# Patient Record
Sex: FEMALE | Race: WHITE | NOT HISPANIC OR LATINO | Employment: PART TIME | URBAN - METROPOLITAN AREA
[De-identification: names, ages, dates, MRNs, and addresses within clinical notes are randomized per-mention and may not be internally consistent; named-entity substitution may affect disease eponyms.]

---

## 2022-08-09 ENCOUNTER — HOSPITAL ENCOUNTER (EMERGENCY)
Facility: CLINIC | Age: 37
Discharge: HOME | End: 2022-08-10
Attending: EMERGENCY MEDICINE | Admitting: EMERGENCY MEDICINE
Payer: MEDICAID

## 2022-08-09 VITALS
BODY MASS INDEX: 22.73 KG/M2 | SYSTOLIC BLOOD PRESSURE: 122 MMHG | DIASTOLIC BLOOD PRESSURE: 80 MMHG | HEART RATE: 71 BPM | OXYGEN SATURATION: 100 % | RESPIRATION RATE: 22 BRPM | HEIGHT: 68 IN | TEMPERATURE: 97.2 F | WEIGHT: 150 LBS

## 2022-08-09 DIAGNOSIS — J04.0 ACUTE VIRAL LARYNGITIS: ICD-10-CM

## 2022-08-09 LAB — DEPRECATED S PYO AG THROAT QL EIA: NEGATIVE

## 2022-08-09 PROCEDURE — 250N000011 HC RX IP 250 OP 636: Performed by: EMERGENCY MEDICINE

## 2022-08-09 PROCEDURE — 99283 EMERGENCY DEPT VISIT LOW MDM: CPT

## 2022-08-09 PROCEDURE — 250N000009 HC RX 250: Performed by: EMERGENCY MEDICINE

## 2022-08-09 PROCEDURE — 87651 STREP A DNA AMP PROBE: CPT | Performed by: EMERGENCY MEDICINE

## 2022-08-09 RX ADMIN — ORAL VEHICLES - SUSP 10 MG: SUSPENSION at 23:59

## 2022-08-09 ASSESSMENT — ENCOUNTER SYMPTOMS
TROUBLE SWALLOWING: 1
COUGH: 1
VOICE CHANGE: 1
SORE THROAT: 1

## 2022-08-09 ASSESSMENT — ACTIVITIES OF DAILY LIVING (ADL): ADLS_ACUITY_SCORE: 33

## 2022-08-09 NOTE — LETTER
August 9, 2022      To Whom It May Concern:      Neisha Lucio was seen in our Emergency Department today, 08/09/22.  I expect her condition to improve over the next 2-3 days.  She may return to work/school when improved.    Sincerely,        Maurisio Lindo MD

## 2022-08-10 LAB — GROUP A STREP BY PCR: NOT DETECTED

## 2022-08-10 NOTE — ED PROVIDER NOTES
"  History   Chief Complaint:  Pharyngitis       HPI   Neisha Lucio is a 37 year old female with no significant PMH who presents with pharyngitis. The patient reports onset of a sore throat yesterday. She states that it worsened today and it hurts to swallow and she feels as if she is not getting as much air down. She also notes a productive cough with greenish brown mucous. She has been taking motrin and Tylenol. Her last dose of Tylenol was at 1800. Her voice is hoarse. She states that her son is currently sick at home with congestion and an ear infection. 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 She denies any fever. No facial or neck swelling. She denies any other symptoms. She reports she recently recovered from a mild COVID infection last month.     Review of Systems   HENT: Positive for sore throat, trouble swallowing and voice change.    Respiratory: Positive for cough (productive).    All other systems reviewed and are negative.      Allergies:  The patient has no known allergies.     Medications:  Zoloft     Past Medical History:     Anxiety     Past Surgical History:       Appendectomy     Social History:  The patient presents to the ED alone  Living Situation: she lives with her children    Physical Exam     Patient Vitals for the past 24 hrs:   BP Temp Temp src Pulse Resp SpO2 Height Weight   22 2159 122/80 97.2  F (36.2  C) Temporal 71 22 100 % 1.727 m (5' 8\") 68 kg (150 lb)       Physical Exam  General: Well appearing, nontoxic. Resting comfortably  Head:  Scalp, face, and head appear normal  Eyes:  Pupils equal, round, and reactive to light    Conjunctivae noninjected and sclera white  ENT:    The nose is normal    Ears/pinnae are normal. TMs and auditory canals normal bilaterally.    MMM. Posterior pharynx mildly erythematous without swelling, exudates. Minimal swelling of the uvula. No mucosal lesions, tongue or lip swelling. No tongue elevation. Uvula  " "without deviation. Voice normal. No drooling or trismus. No peritonsillar fullness.   Neck:  Normal range of motion. Soft tissues of the neck are normal. Mild shotty cervical lymphadenopathy. No stridor. Voice is hoarse.   MSK:  Normal tone.   CV:  RRR, no M/R/G  Resp:  CTAB, no increased WOB  Skin:  No rash or lesions noted.  Neuro:  Speech is normal and fluent    Moves all extremities spontaneously  Psych: Awake, Alert. Normal affect      Appropriate interactions           Emergency Department Course   Laboratory:  Labs Ordered and Resulted from Time of ED Arrival to Time of ED Departure   STREPTOCOCCUS A RAPID SCREEN W REFELX TO PCR - Normal       Result Value    Group A Strep antigen Negative     GROUP A STREPTOCOCCUS PCR THROAT SWAB        Emergency Department Course:  Reviewed:  I reviewed nursing notes, vitals, past medical history and Care Everywhere    Assessments/consults:  ED Course as of 08/09/22 2318   Tue Aug 09, 2022   2303 I evaluated the patient, obtained history and explained results and plan at this time.       Interventions:  Decadron, 10 mg, PO    Disposition:  The patient was discharged to home.     Impression & Plan   Medical Decision Making:  Neisha Lucio is a 37 year old female he had mild COVID-19 infection last month who presents with 2 days of sore throat and hoarse voice.  On my evaluation she is well-appearing, hemodynamically stable and afebrile.  She has no fevers at home.  Rapid strep test is negative.  PCR confirmatory test is pending.  Her son is sick with URI symptoms and a \"ear infection\".  She has no evidence of otitis media or externa herself.  Findings are consistent with viral upper respiratory infection with pharyngitis and laryngitis.  For symptom relief she was treated with a single dose of Decadron.  No indication for antibiotics at this time.  I recommended supportive care with over-the-counter sore throat medications, Tylenol and ibuprofen.  I stressed the " importance of good oral fluid intake.  No evidence of peritonsillar abscess, Dede's angina, retropharyngeal abscess or other deep space infection of the skin or neck.  Findings and plan of care discussed with the patient.  She is agreeable with the plan of care.  Close return precautions were provided and she was discharged in stable condition.    Diagnosis:    ICD-10-CM    1. Acute viral laryngitis  J04.0        Discharge Medications:  New Prescriptions    No medications on file       Scribe Disclosure:  I, Son Arrington, am serving as a scribe at 10:54 PM on 8/9/2022 to document services personally performed by Maurisio Lindo MD based on my observations and the provider's statements to me.            Maurisio Lindo MD  08/10/22 [id number]

## 2023-02-25 ENCOUNTER — HOSPITAL ENCOUNTER (EMERGENCY)
Facility: CLINIC | Age: 38
Discharge: HOME | End: 2023-02-26
Attending: EMERGENCY MEDICINE | Admitting: EMERGENCY MEDICINE
Payer: COMMERCIAL

## 2023-02-25 VITALS
HEIGHT: 68 IN | TEMPERATURE: 98.3 F | RESPIRATION RATE: 18 BRPM | HEART RATE: 62 BPM | DIASTOLIC BLOOD PRESSURE: 74 MMHG | OXYGEN SATURATION: 100 % | BODY MASS INDEX: 23.49 KG/M2 | WEIGHT: 155 LBS | SYSTOLIC BLOOD PRESSURE: 142 MMHG

## 2023-02-25 DIAGNOSIS — J02.9 SORE THROAT: ICD-10-CM

## 2023-02-25 LAB
DEPRECATED S PYO AG THROAT QL EIA: NEGATIVE
FLUAV RNA SPEC QL NAA+PROBE: NEGATIVE
FLUBV RNA RESP QL NAA+PROBE: NEGATIVE
GROUP A STREP BY PCR: NOT DETECTED
RSV RNA SPEC NAA+PROBE: NEGATIVE
SARS-COV-2 RNA RESP QL NAA+PROBE: NEGATIVE

## 2023-02-25 PROCEDURE — 87637 SARSCOV2&INF A&B&RSV AMP PRB: CPT | Performed by: EMERGENCY MEDICINE

## 2023-02-25 PROCEDURE — C9803 HOPD COVID-19 SPEC COLLECT: HCPCS

## 2023-02-25 PROCEDURE — 87651 STREP A DNA AMP PROBE: CPT | Performed by: EMERGENCY MEDICINE

## 2023-02-25 PROCEDURE — 99283 EMERGENCY DEPT VISIT LOW MDM: CPT | Mod: CS

## 2023-02-26 PROCEDURE — 250N000009 HC RX 250: Performed by: EMERGENCY MEDICINE

## 2023-02-26 PROCEDURE — 250N000012 HC RX MED GY IP 250 OP 636 PS 637: Performed by: EMERGENCY MEDICINE

## 2023-02-26 RX ADMIN — ORAL VEHICLES - SUSP 10 MG: SUSPENSION at 00:22

## 2023-02-26 ASSESSMENT — ENCOUNTER SYMPTOMS
NAUSEA: 0
FEVER: 0
VOMITING: 0
SORE THROAT: 1
FACIAL SWELLING: 0
TROUBLE SWALLOWING: 1

## 2023-02-26 NOTE — ED TRIAGE NOTES
Pt reports sore throat started this AM and now feels like her throat is closing. Able to manage secretions. Feels similar to when she was here in August.

## 2023-02-26 NOTE — ED PROVIDER NOTES
"  History     Chief Complaint:  Pharyngitis       HPI   Neisha Lucio is a 37 year old female who presents with throat pain and tightness beginning today. Patient states that she developed a sore throat this morning and then noticed worsening swelling and tightness in her throat throughout the day. She notes that it is currently painful to talk and swallow, though she is still able to swallow. She took Motrin around  tonight and gargled with salt water with no relief of symptoms. Denies fevers, nausea, and vomiting. No lip or tongue swelling. Patient says her current symptoms are similar to the symptoms she was seen for in August of last year which required steroids for swelling, though the onset of today's symptoms were much more sudden. She adds that she is currently breastfeeding. Neisha denies any personal history of oral abscess and does not have any chronic medical problems.     Independent Historian:   None - Patient Only      ROS:  Review of Systems   Constitutional: Negative for fever.   HENT: Positive for sore throat and trouble swallowing. Negative for facial swelling.    Gastrointestinal: Negative for nausea and vomiting.     10 point review of systems performed and is negative except as above and in HPI.    Allergies:  No Known Allergies     Medications:    Zoloft    Past Medical History:    Anemia   Anxiety   Hematometra    Past Surgical History:    Appendectomy   section    D & C    Social History:  Arrives alone via private vehicle.    Physical Exam     Patient Vitals for the past 24 hrs:   BP Temp Temp src Pulse Resp SpO2 Height Weight   23 2129 (!) 142/74 98.3  F (36.8  C) Oral 62 18 100 % 1.727 m (5' 8\") 70.3 kg (155 lb)        Physical Exam  General: Alert, No distress. Nontoxic appearance  Head: No signs of trauma.   Mouth/Throat: Oropharynx moist. Slight tonsillar hypertrophy. No exudate. No erythema.   Eyes: Conjunctivae are normal. Pupils are equal..   Neck: Normal " range of motion.    CV: Appears well perfused.  Resp:No respiratory distress. No stridor.   MSK: Normal range of motion. No obvious deformity.   Neuro: The patient is alert and interactive. HAT[place]. Speech normal. GCS 15  Skin: No lesion or sign of trauma noted.   Psych: normal mood and affect. behavior is normal.     Emergency Department Course     Laboratory:  Labs Ordered and Resulted from Time of ED Arrival to Time of ED Departure   INFLUENZA A/B & SARS-COV2 PCR MULTIPLEX - Normal       Result Value    Influenza A PCR Negative      Influenza B PCR Negative      RSV PCR Negative      SARS CoV2 PCR Negative     STREPTOCOCCUS A RAPID SCREEN W REFELX TO PCR - Normal    Group A Strep antigen Negative     GROUP A STREPTOCOCCUS PCR THROAT SWAB - Normal    Group A strep by PCR Not Detected          Emergency Department Course & Assessments:       Interventions:  Medications   dexamethasone (DECADRON) alcohol-free oral solution 10 mg (10 mg Oral $Given 2/26/23 0022)      Assessments:  2340 I obtained history and examined the patient as noted above. I also explained findings at this time.  0024 Rechecked patient and confirmed that steroids are safe with breastfeeding.     Disposition:  The patient was discharged to home.     Impression & Plan      Medical Decision Making:  Neisha Lucio is a 37 year old female who presents for evaluation of a sore throat and clinical evidence of pharyngitis.  The rapid strep test is negative, and formal culture has been set up in the lab. There is no clinical evidence of peritonsillar abscess, retropharyngeal abscess, Lemierre's Syndrome, epiglottis, or Gomez's angina. The etiology is most likely viral.   I have recommended treatment with analgesics, and we will await formal culture results.  If the culture is positive, an ED physician will call the patient to initiate anti-microbial therapy. Return if increasing pain, change in voice, neck pain, vomiting, fever, or shortness of  breath. Follow-up with primary physician if not improving in 3-5 days. Given well appearance, I would not test further for other etiologies of serious bacterial infections. Plan discussed with patient. All questions answered.     Diagnosis:    ICD-10-CM    1. Sore throat  J02.9            Scribe Disclosure:  DAYANA, Lydia Cobb, am serving as a scribe at 12:14 AM on 2/26/2023 to document services personally performed by Ying Taylor MD based on my observations and the provider's statements to me.     2/25/2023   Ying Taylor M Debroux, Karah M, MD  02/26/23 0559

## 2024-01-19 ENCOUNTER — NURSE TRIAGE (OUTPATIENT)
Dept: NURSING | Facility: CLINIC | Age: 39
End: 2024-01-19
Payer: COMMERCIAL

## 2024-01-19 ENCOUNTER — HOSPITAL ENCOUNTER (EMERGENCY)
Facility: CLINIC | Age: 39
Discharge: HOME | End: 2024-01-19
Attending: PHYSICIAN ASSISTANT | Admitting: PHYSICIAN ASSISTANT
Payer: COMMERCIAL

## 2024-01-19 ENCOUNTER — APPOINTMENT (OUTPATIENT)
Dept: CT IMAGING | Facility: CLINIC | Age: 39
End: 2024-01-19
Attending: PHYSICIAN ASSISTANT
Payer: COMMERCIAL

## 2024-01-19 VITALS
DIASTOLIC BLOOD PRESSURE: 74 MMHG | TEMPERATURE: 97.8 F | OXYGEN SATURATION: 99 % | HEART RATE: 68 BPM | RESPIRATION RATE: 16 BRPM | WEIGHT: 160 LBS | HEIGHT: 68 IN | SYSTOLIC BLOOD PRESSURE: 134 MMHG | BODY MASS INDEX: 24.25 KG/M2

## 2024-01-19 DIAGNOSIS — R10.31 RLQ ABDOMINAL PAIN: ICD-10-CM

## 2024-01-19 LAB
ALBUMIN SERPL BCG-MCNC: 4.8 G/DL (ref 3.5–5.2)
ALBUMIN UR-MCNC: NEGATIVE MG/DL
ALP SERPL-CCNC: 66 U/L (ref 40–150)
ALT SERPL W P-5'-P-CCNC: NORMAL U/L
AMORPH CRY #/AREA URNS HPF: ABNORMAL /HPF
ANION GAP SERPL CALCULATED.3IONS-SCNC: 10 MMOL/L (ref 7–15)
APPEARANCE UR: CLEAR
AST SERPL W P-5'-P-CCNC: NORMAL U/L
BASOPHILS # BLD AUTO: 0.1 10E3/UL (ref 0–0.2)
BASOPHILS NFR BLD AUTO: 1 %
BILIRUB SERPL-MCNC: 0.3 MG/DL
BILIRUB UR QL STRIP: NEGATIVE
BUN SERPL-MCNC: 15.7 MG/DL (ref 6–20)
CALCIUM SERPL-MCNC: 9.4 MG/DL (ref 8.6–10)
CHLORIDE SERPL-SCNC: 101 MMOL/L (ref 98–107)
COLOR UR AUTO: ABNORMAL
CREAT SERPL-MCNC: 0.54 MG/DL (ref 0.51–0.95)
DEPRECATED HCO3 PLAS-SCNC: 26 MMOL/L (ref 22–29)
EGFRCR SERPLBLD CKD-EPI 2021: >90 ML/MIN/1.73M2
EOSINOPHIL # BLD AUTO: 0.1 10E3/UL (ref 0–0.7)
EOSINOPHIL NFR BLD AUTO: 2 %
ERYTHROCYTE [DISTWIDTH] IN BLOOD BY AUTOMATED COUNT: 12.4 % (ref 10–15)
GLUCOSE SERPL-MCNC: 94 MG/DL (ref 70–99)
GLUCOSE UR STRIP-MCNC: NEGATIVE MG/DL
HCG UR QL: NEGATIVE
HCT VFR BLD AUTO: 38.3 % (ref 35–47)
HGB BLD-MCNC: 13 G/DL (ref 11.7–15.7)
HGB UR QL STRIP: NEGATIVE
HOLD SPECIMEN: NORMAL
IMM GRANULOCYTES # BLD: 0 10E3/UL
IMM GRANULOCYTES NFR BLD: 0 %
KETONES UR STRIP-MCNC: NEGATIVE MG/DL
LEUKOCYTE ESTERASE UR QL STRIP: NEGATIVE
LYMPHOCYTES # BLD AUTO: 1.4 10E3/UL (ref 0.8–5.3)
LYMPHOCYTES NFR BLD AUTO: 24 %
MCH RBC QN AUTO: 31.6 PG (ref 26.5–33)
MCHC RBC AUTO-ENTMCNC: 33.9 G/DL (ref 31.5–36.5)
MCV RBC AUTO: 93 FL (ref 78–100)
MONOCYTES # BLD AUTO: 0.3 10E3/UL (ref 0–1.3)
MONOCYTES NFR BLD AUTO: 5 %
MUCOUS THREADS #/AREA URNS LPF: PRESENT /LPF
NEUTROPHILS # BLD AUTO: 4.2 10E3/UL (ref 1.6–8.3)
NEUTROPHILS NFR BLD AUTO: 68 %
NITRATE UR QL: NEGATIVE
NRBC # BLD AUTO: 0 10E3/UL
NRBC BLD AUTO-RTO: 0 /100
PH UR STRIP: 7 [PH] (ref 5–7)
PLATELET # BLD AUTO: 231 10E3/UL (ref 150–450)
POTASSIUM SERPL-SCNC: 4.8 MMOL/L (ref 3.4–5.3)
PROT SERPL-MCNC: 8 G/DL (ref 6.4–8.3)
RBC # BLD AUTO: 4.12 10E6/UL (ref 3.8–5.2)
RBC URINE: <1 /HPF
SODIUM SERPL-SCNC: 137 MMOL/L (ref 135–145)
SP GR UR STRIP: 1.01 (ref 1–1.03)
SQUAMOUS EPITHELIAL: <1 /HPF
UROBILINOGEN UR STRIP-MCNC: NORMAL MG/DL
WBC # BLD AUTO: 6.1 10E3/UL (ref 4–11)
WBC URINE: 0 /HPF

## 2024-01-19 PROCEDURE — 85025 COMPLETE CBC W/AUTO DIFF WBC: CPT | Performed by: PHYSICIAN ASSISTANT

## 2024-01-19 PROCEDURE — 258N000003 HC RX IP 258 OP 636: Performed by: PHYSICIAN ASSISTANT

## 2024-01-19 PROCEDURE — 250N000011 HC RX IP 250 OP 636: Performed by: PHYSICIAN ASSISTANT

## 2024-01-19 PROCEDURE — 81025 URINE PREGNANCY TEST: CPT | Performed by: PHYSICIAN ASSISTANT

## 2024-01-19 PROCEDURE — 99285 EMERGENCY DEPT VISIT HI MDM: CPT | Mod: 25

## 2024-01-19 PROCEDURE — 96374 THER/PROPH/DIAG INJ IV PUSH: CPT | Mod: 59

## 2024-01-19 PROCEDURE — 250N000009 HC RX 250: Performed by: PHYSICIAN ASSISTANT

## 2024-01-19 PROCEDURE — 81001 URINALYSIS AUTO W/SCOPE: CPT | Performed by: PHYSICIAN ASSISTANT

## 2024-01-19 PROCEDURE — 82247 BILIRUBIN TOTAL: CPT | Performed by: PHYSICIAN ASSISTANT

## 2024-01-19 PROCEDURE — 96361 HYDRATE IV INFUSION ADD-ON: CPT

## 2024-01-19 PROCEDURE — 74177 CT ABD & PELVIS W/CONTRAST: CPT

## 2024-01-19 PROCEDURE — 36415 COLL VENOUS BLD VENIPUNCTURE: CPT | Performed by: PHYSICIAN ASSISTANT

## 2024-01-19 PROCEDURE — 84155 ASSAY OF PROTEIN SERUM: CPT | Performed by: PHYSICIAN ASSISTANT

## 2024-01-19 RX ORDER — KETOROLAC TROMETHAMINE 15 MG/ML
15 INJECTION, SOLUTION INTRAMUSCULAR; INTRAVENOUS ONCE
Status: COMPLETED | OUTPATIENT
Start: 2024-01-19 | End: 2024-01-19

## 2024-01-19 RX ORDER — HYDROMORPHONE HYDROCHLORIDE 1 MG/ML
0.5 INJECTION, SOLUTION INTRAMUSCULAR; INTRAVENOUS; SUBCUTANEOUS
Status: DISCONTINUED | OUTPATIENT
Start: 2024-01-19 | End: 2024-01-19 | Stop reason: HOSPADM

## 2024-01-19 RX ORDER — IOPAMIDOL 755 MG/ML
81 INJECTION, SOLUTION INTRAVASCULAR ONCE
Status: COMPLETED | OUTPATIENT
Start: 2024-01-19 | End: 2024-01-19

## 2024-01-19 RX ORDER — METOCLOPRAMIDE 5 MG/1
5 TABLET ORAL 3 TIMES DAILY PRN
Qty: 10 TABLET | Refills: 0 | Status: SHIPPED | OUTPATIENT
Start: 2024-01-19

## 2024-01-19 RX ADMIN — KETOROLAC TROMETHAMINE 15 MG: 15 INJECTION, SOLUTION INTRAMUSCULAR; INTRAVENOUS at 16:05

## 2024-01-19 RX ADMIN — SODIUM CHLORIDE 63 ML: 9 INJECTION, SOLUTION INTRAVENOUS at 17:02

## 2024-01-19 RX ADMIN — IOPAMIDOL 81 ML: 755 INJECTION, SOLUTION INTRAVENOUS at 17:02

## 2024-01-19 RX ADMIN — SODIUM CHLORIDE 1000 ML: 9 INJECTION, SOLUTION INTRAVENOUS at 16:06

## 2024-01-19 ASSESSMENT — ACTIVITIES OF DAILY LIVING (ADL): ADLS_ACUITY_SCORE: 35

## 2024-01-19 NOTE — TELEPHONE ENCOUNTER
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 Pt verbalized understanding and agrees to plan of care.    Katie Beavers RN  [place] Nurse Advisor  12:38 PM 1/19/2024        Reason for Disposition   SEVERE abdominal pain (e.g., excruciating)    Additional Information   Negative: Passed out (i.e., fainted, collapsed and was not responding)   Negative: Shock suspected (e.g., cold/pale/clammy skin, too weak to stand, low BP, rapid pulse)   Negative: Sounds like a life-threatening emergency to the triager   Negative: Followed an abdomen (stomach) injury   Negative: Chest pain   Negative: Abdominal pain and pregnant < 20 weeks   Negative: Abdominal pain and pregnant 20 or more weeks   Negative: Pain is mainly in upper abdomen (if needed ask: 'is it mainly above the belly button?')   Negative: Abdomen bloating or swelling are main symptoms    Protocols used: Abdominal Pain - Female-A-[place]

## 2024-01-19 NOTE — ED TRIAGE NOTES
PT STS SHE HAS RIGHT SIDE ABD pain intermittent for 3 days     Triage Assessment (Adult)       Row Name 01/19/24 1257          Triage Assessment    Airway WDL WDL        Respiratory WDL    Respiratory WDL WDL        Skin Circulation/Temperature WDL    Skin Circulation/Temperature WDL WDL        Cardiac WDL    Cardiac WDL WDL        Peripheral/Neurovascular WDL    Peripheral Neurovascular WDL WDL        Cognitive/Neuro/Behavioral WDL    Cognitive/Neuro/Behavioral WDL WDL

## 2024-01-19 NOTE — ED PROVIDER NOTES
"  History     Chief Complaint:  Abdominal Pain    HPI   Neisha Lucio is a 38 year old female with a history of appendectomy and tubal ligation who presents for evaluation of right-sided lower abdominal pain.  This been going about 3 days.  It somewhat waxing and waning worse with palpation, though never goes away fully.  She has had some slight nausea but no vomiting diarrhea or constipation.  No dysuria or hematuria.  No unusual vaginal bleeding discharge.  No fevers.  No concern for STIs.  Has not had similar symptoms before.    Independent Historian:   None - Patient Only    Review of External Notes:   Reviewed [facility] note from  where patient was seen by OB for retained products of conception after miscarriage.    Medications:    Sertraline  Ferrous Gluconate    Past Medical History:    Anxiety  Anemia  SAB  Hematometra   Breast disorder    Past Surgical History:     section  Appendectomy   Dilation and curettage  Tubal ligation  Secor teeth extraction    Physical Exam   Patient Vitals for the past 24 hrs:   BP Temp Temp src Pulse Resp SpO2 Height Weight   24 1251 134/74 97.8  F (36.6  C) Temporal 68 16 99 % 1.727 m (5' 8\") 72.6 kg (160 lb)      Physical Exam  General: Awake, alert, non-toxic.  Head:  Scalp is NC/AT  Eyes:  Conjunctiva normal, PERRL  ENT:  The external nose and ears are normal.   Neck:  Normal range of motion without rigidity.  CV:  Regular rate and rhythm    No pathologic murmur, rubs, or gallops.  Resp:  Breath sounds are clear bilaterally    Non-labored, no retractions or accessory muscle use  Abdomen: Abdomen is soft, no distension, RLQ tenderness, otherwise non-tender.  Negative Uriarte sign.  No rebound or guarding.  no masses. No CVA tenderness.  MS:  No lower extremity edema/swelling.   Skin:  Warm and dry, No rash or lesions noted.  Neuro:  Alert and oriented.  GCS 15 Moves all extremities normal.  No facial asymmetry. Gait normal.  Psych:  Awake. " Alert. Normal affect. Appropriate interactions.    Emergency Department Course   Imaging:  Abd/pelvis CT,  IV  contrast only TRAUMA / AAA   Final Result   IMPRESSION:    No acute findings or other explanation for abdominal pain.        Laboratory:  Labs Ordered and Resulted from Time of ED Arrival to Time of ED Departure   ROUTINE UA WITH MICROSCOPIC REFLEX TO CULTURE - Abnormal       Result Value    Color Urine Light Yellow      Appearance Urine Clear      Glucose Urine Negative      Bilirubin Urine Negative      Ketones Urine Negative      Specific Gravity Urine 1.011      Blood Urine Negative      pH Urine 7.0      Protein Albumin Urine Negative      Urobilinogen Urine Normal      Nitrite Urine Negative      Leukocyte Esterase Urine Negative      Mucus Urine Present (*)     Amorphous Crystals Urine Few (*)     RBC Urine <1      WBC Urine 0      Squamous Epithelials Urine <1     HCG QUALITATIVE URINE - Normal    hCG Urine Qualitative Negative     COMPREHENSIVE METABOLIC PANEL    Sodium 137      Potassium 4.8      Carbon Dioxide (CO2) 26      Anion Gap 10      Urea Nitrogen 15.7      Creatinine 0.54      GFR Estimate >90      Calcium 9.4      Chloride 101      Glucose 94      Alkaline Phosphatase 66      AST        ALT        Protein Total 8.0      Albumin 4.8      Bilirubin Total 0.3     CBC WITH PLATELETS AND DIFFERENTIAL    WBC Count 6.1      RBC Count 4.12      Hemoglobin 13.0      Hematocrit 38.3      MCV 93      MCH 31.6      MCHC 33.9      RDW 12.4      Platelet Count 231      % Neutrophils 68      % Lymphocytes 24      % Monocytes 5      % Eosinophils 2      % Basophils 1      % Immature Granulocytes 0      NRBCs per 100 WBC 0      Absolute Neutrophils 4.2      Absolute Lymphocytes 1.4      Absolute Monocytes 0.3      Absolute Eosinophils 0.1      Absolute Basophils 0.1      Absolute Immature Granulocytes 0.0      Absolute NRBCs 0.0       Emergency Department Course &  Assessments:    Interventions:  Medications   HYDROmorphone (PF) (DILAUDID) injection 0.5 mg (has no administration in time range)   sodium chloride 0.9% BOLUS 1,000 mL (0 mLs Intravenous Stopped 24 1739)   ketorolac (TORADOL) injection 15 mg (15 mg Intravenous $Given 24 1605)   iopamidol (ISOVUE-370) solution 81 mL (81 mLs Intravenous $Given 24 1702)   Saline (63 mLs As instructed $Given 24 1702)     Assessments:  I obtained history and examined the patient as noted above.     Independent Interpretation (X-rays, CTs, rhythm strip):  None    Consultations/Discussion of Management or Tests:  None        Social Determinants of Health affecting care:   None    Disposition:  The patient was discharged to home.     Impression & Plan    Medical Decision Makin-year-old female presents with right lower quadrant abdominal pain.  Broad differential considered.  Workup here is reassuring.  CT scan of the abdomen and pelvis with contrast is normal.  She is not pregnant.  UA is clear.  White blood cell count and LFTs are unremarkable.  Clearly reproducible and isolated to the lower abdomen.  Pelvic organs appear normal no ovarian cyst/mass or vomiting very low suspicion for ovarian torsion.  No symptoms or concerns for STI or PID.  Unclear etiology of symptoms but pain improved after Toradol comfortable following up with primary care provider.  Return precautions for new worsening or changing symptoms discussed.    Diagnosis:    ICD-10-CM    1. RLQ abdominal pain  R10.31            Discharge Medications:  New Prescriptions    METOCLOPRAMIDE (REGLAN) 5 MG TABLET    Take 1 tablet (5 mg) by mouth 3 times daily as needed (Nausea, vomiting)     2024   Lavell Devine PA-C [place], Clark Ellsworth, PA-C  [phone]

## 2024-07-28 ENCOUNTER — HEALTH MAINTENANCE LETTER (OUTPATIENT)
Age: 39
End: 2024-07-28

## 2025-01-09 ENCOUNTER — OFFICE VISIT (OUTPATIENT)
Dept: URGENT CARE | Facility: URGENT CARE | Age: 40
End: 2025-01-09
Payer: COMMERCIAL

## 2025-01-09 VITALS
DIASTOLIC BLOOD PRESSURE: 74 MMHG | BODY MASS INDEX: 26.76 KG/M2 | OXYGEN SATURATION: 96 % | TEMPERATURE: 97.2 F | HEART RATE: 70 BPM | SYSTOLIC BLOOD PRESSURE: 124 MMHG | WEIGHT: 176 LBS

## 2025-01-09 DIAGNOSIS — J02.9 SORE THROAT: Primary | ICD-10-CM

## 2025-01-09 LAB
DEPRECATED S PYO AG THROAT QL EIA: NEGATIVE
S PYO DNA THROAT QL NAA+PROBE: NOT DETECTED

## 2025-01-09 NOTE — PROGRESS NOTES
SUBJECTIVE:   Neisha Lucio is a 39 year old female presenting with a chief complaint of   Chief Complaint   Patient presents with    Urgent Care     Sore throat, HA, plugge ears x 2 days  - spouse tested pos for strep this AM        She is a new patient of [facility].   Patient presents with ST for 2 days.  Ear fullness.  Her  in earlier today and positive for strep.          Review of Systems    No past medical history on file.  No family history on file.  Current Outpatient Medications   Medication Sig Dispense Refill    metoclopramide (REGLAN) 5 MG tablet Take 1 tablet (5 mg) by mouth 3 times daily as needed (Nausea, vomiting) 10 tablet 0     Social History     Tobacco Use    Smoking status: Not on file    Smokeless tobacco: Not on file   Substance Use Topics    Alcohol use: Not on file       OBJECTIVE  /74   Pulse 70   Temp 97.2  F (36.2  C) (Tympanic)   Wt 79.8 kg (176 lb)   LMP  (LMP Unknown)   SpO2 96%   Breastfeeding Unknown   BMI 26.76 kg/m      Physical Exam  Vitals and nursing note reviewed.   Constitutional:       Appearance: Normal appearance. She is normal weight.   HENT:      Head: Normocephalic and atraumatic.      Right Ear: Tympanic membrane, ear canal and external ear normal.      Left Ear: Tympanic membrane, ear canal and external ear normal.      Nose: Nose normal.      Mouth/Throat:      Mouth: Mucous membranes are moist.      Pharynx: Oropharynx is clear. Posterior oropharyngeal erythema present.   Eyes:      Extraocular Movements: Extraocular movements intact.      Conjunctiva/sclera: Conjunctivae normal.   Cardiovascular:      Rate and Rhythm: Normal rate and regular rhythm.      Pulses: Normal pulses.      Heart sounds: Normal heart sounds.   Pulmonary:      Effort: Pulmonary effort is normal.      Breath sounds: Normal breath sounds.   Musculoskeletal:      Cervical back: Normal range of motion.   Skin:     General: Skin is warm and dry.      Findings: No rash.    Neurological:      General: No focal deficit present.      Mental Status: She is alert.   Psychiatric:         Mood and Affect: Mood normal.         Behavior: Behavior normal.         Labs:  Results for orders placed or performed in visit on 01/09/25 (from the past 24 hours)   Streptococcus A Rapid Screen w/Reflex to PCR - Clinic Collect    Specimen: Throat; Swab   Result Value Ref Range    Group A Strep antigen Negative Negative         ASSESSMENT:      ICD-10-CM    1. Sore throat  J02.9 Streptococcus A Rapid Screen w/Reflex to PCR - Clinic Collect     Group A Streptococcus PCR Throat Swab           Medical Decision Making:    Differential Diagnosis:  URI Adult/Peds:  Strep pharyngitis and Viral pharyngitis    Serious Comorbid Conditions:  Adult:   reviewed    PLAN:    Tylenol/motrin as needed.  Drink plenty of water.  Gargle with salt water.  May use chloraseptic spray as directed for ST.  Strep pcr pending.      Followup:    If not improving or if condition worsens, follow up with your Primary Care Provider, If not improving or if conditions worsens over the next 12-24 hours, go to the Emergency Department    There are no Patient Instructions on file for this visit.

## 2025-01-19 DIAGNOSIS — J02.0 STREP THROAT: Primary | ICD-10-CM

## 2025-01-19 RX ORDER — AMOXICILLIN 875 MG/1
875 TABLET, COATED ORAL 2 TIMES DAILY
Qty: 20 TABLET | Refills: 0 | Status: SHIPPED | OUTPATIENT
Start: 2025-01-19 | End: 2025-01-29

## 2025-01-19 RX ORDER — FLUCONAZOLE 150 MG/1
150 TABLET ORAL ONCE
Qty: 1 TABLET | Refills: 0 | Status: SHIPPED | OUTPATIENT
Start: 2025-01-19 | End: 2025-01-19

## 2025-02-27 ENCOUNTER — OFFICE VISIT (OUTPATIENT)
Dept: FAMILY MEDICINE | Facility: CLINIC | Age: 40
End: 2025-02-27
Payer: COMMERCIAL

## 2025-02-27 VITALS
OXYGEN SATURATION: 100 % | DIASTOLIC BLOOD PRESSURE: 80 MMHG | HEART RATE: 72 BPM | RESPIRATION RATE: 16 BRPM | BODY MASS INDEX: 26.83 KG/M2 | TEMPERATURE: 97.9 F | SYSTOLIC BLOOD PRESSURE: 125 MMHG | WEIGHT: 177 LBS | HEIGHT: 68 IN

## 2025-02-27 DIAGNOSIS — N64.4 MASTODYNIA: Primary | ICD-10-CM

## 2025-02-27 DIAGNOSIS — R22.32 LUMP OF AXILLA, LEFT: ICD-10-CM

## 2025-02-27 PROBLEM — L70.8 OTHER ACNE: Status: ACTIVE | Noted: 2025-02-27

## 2025-02-27 PROBLEM — F41.9 ANXIETY: Status: ACTIVE | Noted: 2025-02-27

## 2025-02-27 RX ORDER — SERTRALINE HYDROCHLORIDE 100 MG/1
100 TABLET, FILM COATED ORAL DAILY
COMMUNITY
Start: 2024-07-11

## 2025-02-27 RX ORDER — DOXYCYCLINE HYCLATE 20 MG
20 TABLET ORAL 2 TIMES DAILY
COMMUNITY
Start: 2024-10-18

## 2025-02-27 ASSESSMENT — PAIN SCALES - GENERAL: PAINLEVEL_OUTOF10: MILD PAIN (3)

## 2025-02-27 NOTE — PROGRESS NOTES
"  Assessment & Plan       ICD-10-CM    1. Mastodynia  N64.4 MA Diagnostic Left w/ Tony      2. Lump of axilla, left  R22.32 MA Diagnostic Left w/ Tony        Referral done for Diagnostic mammograms  Advised use new orion when shaving to Prevent Infections  Follow up 1 month if Not better    Jeni Grove MD    Ricardo Driver is a 39 year old, presenting for the following health issues:  Mass (Lump in left armpit )        2/27/2025     2:10 PM   Additional Questions   Roomed by Nesha     History of Present Illness       Reason for visit:  Lump in armpit-hard and tender, been there over 5 weeks  Symptom onset:  More than a month  Symptoms include:  Tender lump in armpit  Symptom intensity:  Moderate  Symptom progression:  Worsening  Had these symptoms before:  No   She is taking medications regularly.      Lump left side 6 weeks  Painful  Feels the same  Left Breast feels tender  No mass  She has had Diagnostic mammograms  Positive Family History Breast ca in MGM  No drainage from Nipple  Pt has had a Hyste[facility] at [facility]          Rest of the ROS is Negative except see above and Problem list [stable]        Objective    /80   Pulse 72   Temp 97.9  F (36.6  C) (Temporal)   Resp 16   Ht 1.727 m (5' 7.99\")   Wt 80.3 kg (177 lb)   LMP  (LMP Unknown)   SpO2 100%   BMI 26.92 kg/m    Body mass index is 26.92 kg/m .  Physical Exam   GENERAL: alert and no distress  NECK: no adenopathy, no asymmetry, masses, or scars  BREAST: normal without masses, tenderness or nipple discharge and no palpable axillary masses or adenopathy  Left Breast Mild tenderness   I do not feel any mass   MS: no gross musculoskeletal defects noted, no edema    Pending       Past medical history, family history, medications and social history reviewed today and updated in EPIC.    Signed Electronically by: Jeni Grove MD    "

## 2025-04-10 ENCOUNTER — OFFICE VISIT (OUTPATIENT)
Dept: FAMILY MEDICINE | Facility: CLINIC | Age: 40
End: 2025-04-10
Payer: COMMERCIAL

## 2025-04-10 VITALS
HEART RATE: 76 BPM | DIASTOLIC BLOOD PRESSURE: 62 MMHG | OXYGEN SATURATION: 98 % | TEMPERATURE: 96.9 F | RESPIRATION RATE: 18 BRPM | SYSTOLIC BLOOD PRESSURE: 100 MMHG

## 2025-04-10 DIAGNOSIS — J02.9 PHARYNGITIS, UNSPECIFIED ETIOLOGY: Primary | ICD-10-CM

## 2025-04-10 LAB
DEPRECATED S PYO AG THROAT QL EIA: NEGATIVE
S PYO DNA THROAT QL NAA+PROBE: NOT DETECTED

## 2025-04-10 ASSESSMENT — PAIN SCALES - GENERAL: PAINLEVEL_OUTOF10: MILD PAIN (1)

## 2025-04-10 NOTE — PROGRESS NOTES
"  Assessment & Plan     Pharyngitis, unspecified etiology  - Symptoms suggest a viral infection, but a strep test is needed to confirm. Recent exposure to strep from children and previous treatment for strep two months ago noted. Enlarged lymph nodes present.  - Perform a strep test. If positive, consider prescribing Augmentin, as it is a step up from amoxicillin. If negative, consider it viral and recommend symptomatic management.    Consent was obtained from the patient to use an AI documentation tool in the creation of this note.          BMI  Estimated body mass index is 26.92 kg/m  as calculated from the following:    Height as of 2/27/25: 1.727 m (5' 7.99\").    Weight as of 2/27/25: 80.3 kg (177 lb).         Subjective   Neisha is a 39 year old, presenting for the following health issues:  Pharyngitis (2 positive strep kids at home, 1 with walking pneumonia as well/Sore throat /LEFT EAR PAIN)        4/10/2025     1:42 PM   Additional Questions   Roomed by Derek FONG     History of Present Illness       Reason for visit:  Sore throat- 2 kids with strep at home  Symptom onset:  Today  Symptoms include:  Sore throat  Symptom intensity:  Moderate  Symptom progression:  Worsening  Had these symptoms before:  Yes  Has tried/received treatment for these symptoms:  Yes  Previous treatment was successful:  Yes  Prior treatment description:  Antibiotics  What makes it better:  Motrin   She is taking medications regularly.   Neisha Lucio, age: 39 years    Sore Throat  - Throat hurts, suspecting strep throat  - No fever checked, but was told it was high  - No vomiting or diarrhea  - Last treated for strep two months ago  - Recent exposure to children with strep and walking pneumonia  - Works as a physical therapist assistant, frequent contact with people              Objective    /62   Pulse 76   Temp 96.9  F (36.1  C) (Temporal)   Resp 18   LMP  (LMP Unknown)   SpO2 98%   There is no height or weight " on file to calculate BMI.  Physical Exam  Constitutional:       General: She is not in acute distress.  HENT:      Head: Normocephalic.      Right Ear: Tympanic membrane normal.      Left Ear: Tympanic membrane normal.      Nose: Nose normal.      Mouth/Throat:      Mouth: Mucous membranes are moist.      Pharynx: Oropharynx is clear.   Eyes:      General: No scleral icterus.     Conjunctiva/sclera: Conjunctivae normal.   Pulmonary:      Effort: No respiratory distress.   Lymphadenopathy:      Cervical: Cervical adenopathy (mild) present.   Neurological:      Mental Status: She is alert.   Psychiatric:         Mood and Affect: Mood normal.         Behavior: Behavior normal.                Signed Electronically by: Oj Brooke DO

## 2025-06-06 ENCOUNTER — E-VISIT (OUTPATIENT)
Dept: FAMILY MEDICINE | Facility: CLINIC | Age: 40
End: 2025-06-06
Payer: COMMERCIAL

## 2025-06-06 DIAGNOSIS — L70.9 ACNE, UNSPECIFIED ACNE TYPE: Primary | ICD-10-CM

## 2025-06-06 PROCEDURE — 99207 PR NON-BILLABLE SERV PER CHARTING: CPT | Performed by: FAMILY MEDICINE

## 2025-06-09 NOTE — PATIENT INSTRUCTIONS
Thank you for choosing us for your care. I think an in-clinic or virtual visit would be the best next step based on your symptoms. Please schedule a clinic appointment; you won t be charged for this eVisit.      You can schedule an appointment by clicking here in [employer], or call [phone].

## 2025-06-11 ENCOUNTER — VIRTUAL VISIT (OUTPATIENT)
Dept: FAMILY MEDICINE | Facility: CLINIC | Age: 40
End: 2025-06-11
Payer: COMMERCIAL

## 2025-06-11 DIAGNOSIS — L71.9 ROSACEA: ICD-10-CM

## 2025-06-11 DIAGNOSIS — L70.0 ACNE VULGARIS: Primary | ICD-10-CM

## 2025-06-11 DIAGNOSIS — F41.9 ANXIETY: ICD-10-CM

## 2025-06-11 PROCEDURE — 99207 PR NON-BILLABLE SERV PER CHARTING: CPT | Performed by: PHYSICIAN ASSISTANT

## 2025-06-11 RX ORDER — SERTRALINE HYDROCHLORIDE 100 MG/1
100 TABLET, FILM COATED ORAL DAILY
Qty: 90 TABLET | Refills: 1 | Status: SHIPPED | OUTPATIENT
Start: 2025-06-11

## 2025-06-11 RX ORDER — CLINDAMYCIN PHOSPHATE 10 MG/G
GEL TOPICAL 2 TIMES DAILY
Qty: 60 G | Refills: 1 | Status: SHIPPED | OUTPATIENT
Start: 2025-06-11

## 2025-06-11 RX ORDER — DOXYCYCLINE HYCLATE 20 MG
20 TABLET ORAL 2 TIMES DAILY
Qty: 180 TABLET | Refills: 1 | Status: SHIPPED | OUTPATIENT
Start: 2025-06-11

## 2025-06-11 NOTE — PROGRESS NOTES
"Neisha is a 39 year old who is being evaluated via a billable video visit.    How would you like to obtain your AVS? MyChart  If the video visit is dropped, the invitation should be resent by: Text to cell phone: [phone]  Will anyone else be joining your video visit? No      Assessment & Plan     Acne vulgaris  Rosacea  Acne and rosacea well-managed.  Refilled medications.  - doxycycline hyclate (PERIOSTAT) 20 MG tablet  Dispense: 180 tablet; Refill: 1  - clindamycin (CLEOCIN-T) 1 % external gel  Dispense: 60 g; Refill: 1    Anxiety  Mood stable.  Refilled Zoloft.  - sertraline (ZOLOFT) 100 MG tablet  Dispense: 90 tablet; Refill: 1    BMI  Estimated body mass index is 26.92 kg/m  as calculated from the following:    Height as of 2/27/25: 1.727 m (5' 7.99\").    Weight as of 2/27/25: 80.3 kg (177 lb).   Weight management plan: Discussed healthy diet and exercise guidelines      The longitudinal plan of care for the diagnosis(es)/condition(s) as documented were addressed during this visit. Due to the added complexity in care, I will continue to support Neisha in the subsequent management and with ongoing continuity of care.    Subjective   Neisha is a 39 year old, presenting for the following health issues:    Recent insurance change.  Needed to switch health systems.    Requesting medication refills today.  Takes doxycycline (periostat) 20 mg BID for rosacea prevention.  Additionally, uses clindamycin gel.  Both medications have been a game changer for rosacea as well as acne.  No issues from the medication.    Additionally, suffers from anxiety.  Takes Zoloft 100 mg daily which is very effective.    Up-to-date on mammogram.  No longer in need of Paps given hysterectomy and removal of her cervix.    Medication Refill        6/11/2025    12:51 PM   Additional Questions   Roomed by Inez         Medication Refill    History of Present Illness       Reason for visit:  Refill meds    She eats 4 or more servings " of fruits and vegetables daily.She consumes 0 sweetened beverage(s) daily.She exercises with enough effort to increase her heart rate 20 to 29 minutes per day.  She exercises with enough effort to increase her heart rate 3 or less days per week.   She is taking medications regularly.        Review of Systems  Constitutional, neuro, ENT, endocrine, pulmonary, cardiac, gastrointestinal, genitourinary, musculoskeletal, integument and psychiatric systems are negative, except as otherwise noted.      Objective           Vitals:  No vitals were obtained today due to virtual visit.    Physical Exam   GENERAL: alert and no distress  EYES: Eyes grossly normal to inspection.  No discharge or erythema, or obvious scleral/conjunctival abnormalities.  RESP: No audible wheeze, cough, or visible cyanosis.    SKIN: Visible skin clear. No significant rash, abnormal pigmentation or lesions.  NEURO: Cranial nerves grossly intact.  Mentation and speech appropriate for age.  PSYCH: Appropriate affect, tone, and pace of words        Video-Visit Details    Type of service:  Video Visit   Originating Location (pt. Location): Home    Distant Location (provider location):  On-site  Platform used for Video Visit: [facility]    The likelihood of other entities in the differential is insufficient to justify any further testing for them at this time. This was explained to the patient. The patient was advised that persistent or worsening symptoms would require further evaluation. Patient advised to call the office and if unable to reach to go to the emergency room if they develop any new or worsening symptoms. Expressed understanding and agreement with above stated plan.     Signed Electronically by: Ricki Barclay PA-C

## 2025-06-15 ENCOUNTER — OFFICE VISIT (OUTPATIENT)
Dept: URGENT CARE | Facility: URGENT CARE | Age: 40
End: 2025-06-15
Payer: COMMERCIAL

## 2025-06-15 VITALS
HEIGHT: 68 IN | RESPIRATION RATE: 18 BRPM | DIASTOLIC BLOOD PRESSURE: 62 MMHG | SYSTOLIC BLOOD PRESSURE: 104 MMHG | OXYGEN SATURATION: 98 % | HEART RATE: 75 BPM | BODY MASS INDEX: 25.76 KG/M2 | WEIGHT: 170 LBS | TEMPERATURE: 98.3 F

## 2025-06-15 DIAGNOSIS — J01.00 ACUTE NON-RECURRENT MAXILLARY SINUSITIS: Primary | ICD-10-CM

## 2025-06-15 PROCEDURE — 3078F DIAST BP <80 MM HG: CPT | Performed by: FAMILY MEDICINE

## 2025-06-15 PROCEDURE — 3074F SYST BP LT 130 MM HG: CPT | Performed by: FAMILY MEDICINE

## 2025-06-15 PROCEDURE — 99214 OFFICE O/P EST MOD 30 MIN: CPT | Performed by: FAMILY MEDICINE

## 2025-06-15 NOTE — PROGRESS NOTES
"Urgent Care Clinic Visit    Chief Complaint   Patient presents with    Urgent Care    Sinus Problem    Ear Problem     Pt in clinic to have eval for sinus pain, congestion, and plugged ears.               6/15/2025     9:32 AM   Additional Questions   Roomed by ing             Assessment & Plan     Acute non-recurrent maxillary sinusitis  Use warm packs over sinuses three times daily.   Oxymetazolone naasal spray 3-4 sprays three times daily as decongestant, do not use longer than 3-4 days to avoid rebound congestion.     Ibuprofen 600mg three times daily x 4-5 days to help sinuses drain.     If not improving in 1-2 days start augmentin.     Educational material provided.       - amoxicillin-clavulanate (AUGMENTIN) 875-125 MG tablet; Take 1 tablet by mouth 2 times daily for 10 days.                Ricardo Driver is a 39 year old, presenting for the following health issues:  Urgent Care, Sinus Problem, and Ear Problem (Pt in clinic to have eval for sinus pain, congestion, and plugged ears.)      6/15/2025     9:32 AM   Additional Questions   Roomed by ing     HPI      Uri symptoms x 5 days (nasal congestion without fevers) now facial/sinus pain left side, feverish, some chest congestion despite using decongestants.  Similar to previous sinus infections.               Objective    /62   Pulse 75   Temp 98.3  F (36.8  C) (Temporal)   Resp 18   Ht 1.727 m (5' 8\")   Wt 77.1 kg (170 lb)   LMP  (LMP Unknown)   SpO2 98%   BMI 25.85 kg/m    Body mass index is 25.85 kg/m .  Physical Exam   Tympanic membranes clear/mobile.     Nasal congestion.     painwith palpation left frontal and maxillary sinus.     Lungs clear.             Signed Electronically by: Joel Daniel Wegener, MD      "

## 2025-08-10 ENCOUNTER — HEALTH MAINTENANCE LETTER (OUTPATIENT)
Age: 40
End: 2025-08-10